# Patient Record
(demographics unavailable — no encounter records)

---

## 2025-04-14 NOTE — HISTORY OF PRESENT ILLNESS
[Previously active] : previously active [Men] : men [No] : No [FreeTextEntry1] : 57yo P1 postmenopqausal here for annual exam. Pt is doing well overall. Reports vaginal odor.  h/o BV. [Mammogramdate] : 1/2025 [BreastSonogramDate] : 1/2025 [FreeTextEntry2] : Not for 7 years

## 2025-04-14 NOTE — END OF VISIT
[FreeTextEntry3] : I, Saloni Carlisle, acted as a scribe on behalf of Dr. Maile Anaya M.D., on 04/14/2025.   All medical entries made by the scribe were at my, Dr. Maile Anaya M.D., direction and personally dictated by me on 04/14/2025. I have reviewed the chart and agree that the record accurately reflects my personal performance of the history, physical exam, assessment and plan. I have also personally directed, reviewed, and agreed with the chart.

## 2025-04-14 NOTE — DISCUSSION/SUMMARY
[FreeTextEntry1] : - Pap/HPV obtained today - Mammo/Sono UTD - GI Referral given for Colonoscopy - Referral for plastic surgery given as pt interested in abdominoplasty - Pelvic sonogram referral given due to h/o fibroid uterus - Affirm obtained - GC/Chlam testing done -Metrogel sent to pharmacy for clinical e/o BV - Vulvar hygiene reviewed   PHQ9 Screenin min